# Patient Record
Sex: FEMALE | Race: WHITE | NOT HISPANIC OR LATINO | Employment: OTHER | URBAN - METROPOLITAN AREA
[De-identification: names, ages, dates, MRNs, and addresses within clinical notes are randomized per-mention and may not be internally consistent; named-entity substitution may affect disease eponyms.]

---

## 2023-04-25 LAB
ALANINE AMINOTRANSFERASE (SGPT) (U/L) IN SER/PLAS: 17 U/L (ref 7–45)
ALBUMIN (G/DL) IN SER/PLAS: 4.2 G/DL (ref 3.4–5)
ALKALINE PHOSPHATASE (U/L) IN SER/PLAS: 94 U/L (ref 33–136)
ANION GAP IN SER/PLAS: 13 MMOL/L (ref 10–20)
ASPARTATE AMINOTRANSFERASE (SGOT) (U/L) IN SER/PLAS: 16 U/L (ref 9–39)
BILIRUBIN TOTAL (MG/DL) IN SER/PLAS: 0.6 MG/DL (ref 0–1.2)
CALCIUM (MG/DL) IN SER/PLAS: 9.6 MG/DL (ref 8.6–10.6)
CARBON DIOXIDE, TOTAL (MMOL/L) IN SER/PLAS: 28 MMOL/L (ref 21–32)
CHLORIDE (MMOL/L) IN SER/PLAS: 104 MMOL/L (ref 98–107)
CREATININE (MG/DL) IN SER/PLAS: 0.85 MG/DL (ref 0.5–1.05)
ESTIMATED AVERAGE GLUCOSE FOR HBA1C: 151 MG/DL
GFR FEMALE: 73 ML/MIN/1.73M2
GLUCOSE (MG/DL) IN SER/PLAS: 144 MG/DL (ref 74–99)
HEMOGLOBIN A1C/HEMOGLOBIN TOTAL IN BLOOD: 6.9 %
POTASSIUM (MMOL/L) IN SER/PLAS: 4.5 MMOL/L (ref 3.5–5.3)
PROTEIN TOTAL: 6.5 G/DL (ref 6.4–8.2)
SODIUM (MMOL/L) IN SER/PLAS: 140 MMOL/L (ref 136–145)
THYROTROPIN (MIU/L) IN SER/PLAS BY DETECTION LIMIT <= 0.05 MIU/L: 1.84 MIU/L (ref 0.44–3.98)
UREA NITROGEN (MG/DL) IN SER/PLAS: 21 MG/DL (ref 6–23)

## 2023-09-14 PROBLEM — R94.31 ABNORMAL ECG: Status: ACTIVE | Noted: 2023-09-14

## 2023-09-14 PROBLEM — E10.9: Status: ACTIVE | Noted: 2023-09-14

## 2023-09-14 PROBLEM — R42 ORTHOSTATIC LIGHTHEADEDNESS: Status: ACTIVE | Noted: 2023-09-14

## 2023-09-14 PROBLEM — K64.9 HEMORRHOID: Status: ACTIVE | Noted: 2023-09-14

## 2023-09-14 PROBLEM — R92.30 DENSE BREAST TISSUE ON MAMMOGRAM: Status: ACTIVE | Noted: 2023-09-14

## 2023-09-14 PROBLEM — E10.65 POORLY CONTROLLED TYPE 1 DIABETES MELLITUS (MULTI): Status: ACTIVE | Noted: 2023-09-14

## 2023-09-14 PROBLEM — F32.A DEPRESSION: Status: ACTIVE | Noted: 2023-09-14

## 2023-09-14 PROBLEM — R42 VERTIGO: Status: ACTIVE | Noted: 2023-09-14

## 2023-09-14 PROBLEM — Z91.89 AT HIGH RISK FOR BREAST CANCER: Status: ACTIVE | Noted: 2023-09-14

## 2023-09-14 PROBLEM — E03.9 ADULT HYPOTHYROIDISM: Status: ACTIVE | Noted: 2023-09-14

## 2023-09-14 PROBLEM — Z71.84 COUNSELING FOR TRAVEL: Status: ACTIVE | Noted: 2023-09-14

## 2023-09-14 PROBLEM — R30.0 DYSURIA: Status: ACTIVE | Noted: 2023-09-14

## 2023-09-14 PROBLEM — R53.81 MALAISE: Status: ACTIVE | Noted: 2023-09-14

## 2023-09-14 PROBLEM — N90.89 VULVAR IRRITATION: Status: ACTIVE | Noted: 2023-09-14

## 2023-09-14 PROBLEM — K21.9 GERD WITHOUT ESOPHAGITIS: Status: ACTIVE | Noted: 2023-09-14

## 2023-09-14 PROBLEM — S61.012A THUMB LACERATION, LEFT, INITIAL ENCOUNTER: Status: ACTIVE | Noted: 2023-09-14

## 2023-09-14 PROBLEM — E55.9 VITAMIN D DEFICIENCY: Status: ACTIVE | Noted: 2023-09-14

## 2023-09-14 RX ORDER — PEN NEEDLE, DIABETIC 30 GX3/16"
NEEDLE, DISPOSABLE MISCELLANEOUS 4 TIMES DAILY
COMMUNITY

## 2023-09-14 RX ORDER — PROPRANOLOL/HYDROCHLOROTHIAZID 40 MG-25MG
1 TABLET ORAL DAILY
COMMUNITY
Start: 2016-05-09

## 2023-09-14 RX ORDER — ESCITALOPRAM OXALATE 10 MG/1
1 TABLET ORAL DAILY
COMMUNITY
End: 2024-04-16 | Stop reason: ALTCHOICE

## 2023-09-14 RX ORDER — MULTIVITAMIN/IRON/FOLIC ACID 18MG-0.4MG
1 TABLET ORAL DAILY
COMMUNITY

## 2023-09-14 RX ORDER — INSULIN LISPRO 100 [IU]/ML
INJECTION, SOLUTION INTRAVENOUS; SUBCUTANEOUS DAILY
COMMUNITY
Start: 2020-06-17

## 2023-09-14 RX ORDER — CALCIUM CITRATE/VITAMIN D3 315MG-6.25
2 TABLET ORAL EVERY MORNING
COMMUNITY

## 2023-09-14 RX ORDER — CHOLECALCIFEROL (VITAMIN D3) 50 MCG
1 TABLET ORAL DAILY
COMMUNITY

## 2023-09-14 RX ORDER — BACLOFEN 20 MG
1 TABLET ORAL DAILY
COMMUNITY

## 2023-09-14 RX ORDER — INSULIN DEGLUDEC 100 U/ML
INJECTION, SOLUTION SUBCUTANEOUS
COMMUNITY
Start: 2022-07-26

## 2023-09-14 RX ORDER — BLOOD-GLUCOSE METER
KIT MISCELLANEOUS
COMMUNITY
Start: 2015-11-24 | End: 2024-04-16 | Stop reason: SDUPTHER

## 2023-09-14 RX ORDER — POLYETHYLENE GLYCOL 3350, SODIUM CHLORIDE, SODIUM BICARBONATE, POTASSIUM CHLORIDE 420; 11.2; 5.72; 1.48 G/4L; G/4L; G/4L; G/4L
POWDER, FOR SOLUTION ORAL
COMMUNITY
Start: 2021-11-09 | End: 2023-10-17 | Stop reason: ALTCHOICE

## 2023-09-14 RX ORDER — GLUC/MSM/COLGN2/HYAL/ANTIARTH3 375-375-20
2 TABLET ORAL DAILY
COMMUNITY

## 2023-09-14 RX ORDER — BLOOD-GLUCOSE,RECEIVER,CONT
EACH MISCELLANEOUS
COMMUNITY

## 2023-09-14 RX ORDER — BIOTIN 5 MG
1 TABLET ORAL DAILY
COMMUNITY
Start: 2014-07-15

## 2023-09-14 RX ORDER — CALCIUM CARB/VITAMIN D3/VIT K1 500-100-40
TABLET,CHEWABLE ORAL 3 TIMES DAILY
COMMUNITY

## 2023-09-14 RX ORDER — ASPIRIN 81 MG/1
1 TABLET ORAL DAILY
COMMUNITY
Start: 2014-10-28 | End: 2024-01-16 | Stop reason: ALTCHOICE

## 2023-10-17 ENCOUNTER — OFFICE VISIT (OUTPATIENT)
Dept: ENDOCRINOLOGY | Facility: CLINIC | Age: 73
End: 2023-10-17
Payer: MEDICARE

## 2023-10-17 VITALS
SYSTOLIC BLOOD PRESSURE: 120 MMHG | BODY MASS INDEX: 21.97 KG/M2 | HEART RATE: 76 BPM | RESPIRATION RATE: 16 BRPM | DIASTOLIC BLOOD PRESSURE: 60 MMHG | WEIGHT: 140 LBS | HEIGHT: 67 IN

## 2023-10-17 DIAGNOSIS — E10.9 TYPE 1 DIABETES MELLITUS WITHOUT COMPLICATION (MULTI): Primary | ICD-10-CM

## 2023-10-17 PROCEDURE — 1036F TOBACCO NON-USER: CPT | Performed by: INTERNAL MEDICINE

## 2023-10-17 PROCEDURE — 99213 OFFICE O/P EST LOW 20 MIN: CPT | Performed by: INTERNAL MEDICINE

## 2023-10-17 PROCEDURE — 3074F SYST BP LT 130 MM HG: CPT | Performed by: INTERNAL MEDICINE

## 2023-10-17 PROCEDURE — 3044F HG A1C LEVEL LT 7.0%: CPT | Performed by: INTERNAL MEDICINE

## 2023-10-17 PROCEDURE — 1126F AMNT PAIN NOTED NONE PRSNT: CPT | Performed by: INTERNAL MEDICINE

## 2023-10-17 PROCEDURE — 3078F DIAST BP <80 MM HG: CPT | Performed by: INTERNAL MEDICINE

## 2023-10-17 RX ORDER — LANCETS 28 GAUGE
1 EACH MISCELLANEOUS AS NEEDED
COMMUNITY

## 2023-10-17 ASSESSMENT — ENCOUNTER SYMPTOMS
FEVER: 0
NAUSEA: 0
VOMITING: 0
PALPITATIONS: 0
DIARRHEA: 0
FATIGUE: 0
CHILLS: 0
SHORTNESS OF BREATH: 0
HEADACHES: 0
COUGH: 0

## 2023-10-17 NOTE — PROGRESS NOTES
"Subjective   Patient ID: Nata Ridley is a 73 y.o. female who presents for Diabetes (Type 1).  HPI  Since last visit doing well with sugars lately, 73% tir.   Still with some am higher numbers but overall doing well.   No issues with tandem + dexcom   Travelling to [place] this week and has basal sample and loaner pump    Review of Systems   Constitutional:  Negative for chills, fatigue and fever.   Respiratory:  Negative for cough and shortness of breath.    Cardiovascular:  Negative for chest pain and palpitations.   Gastrointestinal:  Negative for diarrhea, nausea and vomiting.   Neurological:  Negative for headaches.       Objective    10/17/2023 11:58 AM   /60   Pulse 76   Resp 16   Weight 63.5 kg (140 lb)   Height 1.702 m (5' 7\")       Physical Exam  Constitutional:       Appearance: Normal appearance. She is normal weight.   HENT:      Head: Normocephalic and atraumatic.   Neck:      Thyroid: No thyroid mass, thyromegaly or thyroid tenderness.   Cardiovascular:      Rate and Rhythm: Normal rate and regular rhythm.      Heart sounds: No murmur heard.     No gallop.   Pulmonary:      Effort: Pulmonary effort is normal.      Breath sounds: Normal breath sounds.   Abdominal:      Palpations: Abdomen is soft.      Comments: benign   Neurological:      General: No focal deficit present.      Mental Status: She is alert and oriented to person, place, and time.      Deep Tendon Reflexes: Reflexes are normal and symmetric.   Psychiatric:         Behavior: Behavior is cooperative.         Assessment/Plan   Problem List Items Addressed This Visit    None  Visit Diagnoses         Codes    Type 1 diabetes mellitus without complication (CMS/[facility])    -  Primary E10.9    Relevant Orders    Comprehensive Metabolic Panel    CBC    Lipid Panel    Hemoglobin A1C    Albumin , Urine Random          Due for fasting labs  Sugars on review of tconnect look good  Discussed dexcom 7 and upcoming clearance  Follow up in 3 months   "

## 2023-10-18 ENCOUNTER — LAB (OUTPATIENT)
Dept: LAB | Facility: LAB | Age: 73
End: 2023-10-18
Payer: MEDICARE

## 2023-10-18 DIAGNOSIS — E10.9 TYPE 1 DIABETES MELLITUS WITHOUT COMPLICATION (MULTI): ICD-10-CM

## 2023-10-18 LAB
ALBUMIN SERPL BCP-MCNC: 4.1 G/DL (ref 3.4–5)
ALP SERPL-CCNC: 77 U/L (ref 33–136)
ALT SERPL W P-5'-P-CCNC: 11 U/L (ref 7–45)
ANION GAP SERPL CALC-SCNC: 11 MMOL/L (ref 10–20)
AST SERPL W P-5'-P-CCNC: 15 U/L (ref 9–39)
BILIRUB SERPL-MCNC: 0.5 MG/DL (ref 0–1.2)
BUN SERPL-MCNC: 17 MG/DL (ref 6–23)
CALCIUM SERPL-MCNC: 9.4 MG/DL (ref 8.6–10.6)
CHLORIDE SERPL-SCNC: 105 MMOL/L (ref 98–107)
CHOLEST SERPL-MCNC: 193 MG/DL (ref 0–199)
CHOLESTEROL/HDL RATIO: 2.9
CO2 SERPL-SCNC: 30 MMOL/L (ref 21–32)
CREAT SERPL-MCNC: 0.73 MG/DL (ref 0.5–1.05)
CREAT UR-MCNC: 79.6 MG/DL (ref 20–320)
ERYTHROCYTE [DISTWIDTH] IN BLOOD BY AUTOMATED COUNT: 12.8 % (ref 11.5–14.5)
EST. AVERAGE GLUCOSE BLD GHB EST-MCNC: 154 MG/DL
GFR SERPL CREATININE-BSD FRML MDRD: 88 ML/MIN/1.73M*2
GLUCOSE SERPL-MCNC: 123 MG/DL (ref 74–99)
HBA1C MFR BLD: 7 %
HCT VFR BLD AUTO: 41.5 % (ref 36–46)
HDLC SERPL-MCNC: 65.5 MG/DL
HGB BLD-MCNC: 13.3 G/DL (ref 12–16)
LDLC SERPL CALC-MCNC: 112 MG/DL
MCH RBC QN AUTO: 30.4 PG (ref 26–34)
MCHC RBC AUTO-ENTMCNC: 32 G/DL (ref 32–36)
MCV RBC AUTO: 95 FL (ref 80–100)
MICROALBUMIN UR-MCNC: <7 MG/L
MICROALBUMIN/CREAT UR: NORMAL MG/G{CREAT}
NON HDL CHOLESTEROL: 128 MG/DL (ref 0–149)
NRBC BLD-RTO: 0 /100 WBCS (ref 0–0)
PLATELET # BLD AUTO: 278 X10*3/UL (ref 150–450)
PMV BLD AUTO: 10.6 FL (ref 7.5–11.5)
POTASSIUM SERPL-SCNC: 4.4 MMOL/L (ref 3.5–5.3)
PROT SERPL-MCNC: 6.4 G/DL (ref 6.4–8.2)
RBC # BLD AUTO: 4.38 X10*6/UL (ref 4–5.2)
SODIUM SERPL-SCNC: 142 MMOL/L (ref 136–145)
TRIGL SERPL-MCNC: 79 MG/DL (ref 0–149)
VLDL: 16 MG/DL (ref 0–40)
WBC # BLD AUTO: 4.9 X10*3/UL (ref 4.4–11.3)

## 2023-10-18 PROCEDURE — 80061 LIPID PANEL: CPT

## 2023-10-18 PROCEDURE — 82570 ASSAY OF URINE CREATININE: CPT

## 2023-10-18 PROCEDURE — 36415 COLL VENOUS BLD VENIPUNCTURE: CPT

## 2023-10-18 PROCEDURE — 83036 HEMOGLOBIN GLYCOSYLATED A1C: CPT

## 2023-10-18 PROCEDURE — 85027 COMPLETE CBC AUTOMATED: CPT

## 2023-10-18 PROCEDURE — 80053 COMPREHEN METABOLIC PANEL: CPT

## 2023-10-18 PROCEDURE — 82043 UR ALBUMIN QUANTITATIVE: CPT

## 2024-01-16 ENCOUNTER — OFFICE VISIT (OUTPATIENT)
Dept: ENDOCRINOLOGY | Facility: CLINIC | Age: 74
End: 2024-01-16
Payer: MEDICARE

## 2024-01-16 VITALS
WEIGHT: 137.6 LBS | RESPIRATION RATE: 16 BRPM | SYSTOLIC BLOOD PRESSURE: 112 MMHG | HEIGHT: 67 IN | HEART RATE: 80 BPM | DIASTOLIC BLOOD PRESSURE: 70 MMHG | BODY MASS INDEX: 21.6 KG/M2

## 2024-01-16 DIAGNOSIS — E10.9 CONTROLLED DIABETES MELLITUS TYPE 1 WITHOUT COMPLICATIONS (MULTI): Primary | ICD-10-CM

## 2024-01-16 DIAGNOSIS — E03.9 ADULT HYPOTHYROIDISM: ICD-10-CM

## 2024-01-16 LAB — POC HEMOGLOBIN A1C: 7.2 % (ref 4.2–6.5)

## 2024-01-16 PROCEDURE — 83036 HEMOGLOBIN GLYCOSYLATED A1C: CPT | Performed by: INTERNAL MEDICINE

## 2024-01-16 PROCEDURE — 1036F TOBACCO NON-USER: CPT | Performed by: INTERNAL MEDICINE

## 2024-01-16 PROCEDURE — 1159F MED LIST DOCD IN RCRD: CPT | Performed by: INTERNAL MEDICINE

## 2024-01-16 PROCEDURE — 1126F AMNT PAIN NOTED NONE PRSNT: CPT | Performed by: INTERNAL MEDICINE

## 2024-01-16 PROCEDURE — 3078F DIAST BP <80 MM HG: CPT | Performed by: INTERNAL MEDICINE

## 2024-01-16 PROCEDURE — 3074F SYST BP LT 130 MM HG: CPT | Performed by: INTERNAL MEDICINE

## 2024-01-16 PROCEDURE — 99213 OFFICE O/P EST LOW 20 MIN: CPT | Performed by: INTERNAL MEDICINE

## 2024-01-16 ASSESSMENT — ENCOUNTER SYMPTOMS
VOMITING: 0
FEVER: 0
HEADACHES: 0
CHILLS: 0
NAUSEA: 0
FATIGUE: 0
COUGH: 0
PALPITATIONS: 0
DIARRHEA: 0
SHORTNESS OF BREATH: 0

## 2024-01-16 NOTE — PROGRESS NOTES
Endocrinology: Follow up visit  Subjective   Patient ID: Nata Ridley is a 73 y.o. female who presents for Diabetes (Type 1).    PCP: Patt Nugent MD    HPI  Since last visit continues to utilize tandem pump + dexcom cgm for blood sugars.  Numbers are decent.  Tir at 70% most days.  She would prefer more days 70%+.  Still struggling with am sugars.  Feeling fine.        Review of Systems   Constitutional:  Negative for chills, fatigue and fever.   Respiratory:  Negative for cough and shortness of breath.    Cardiovascular:  Negative for chest pain and palpitations.   Gastrointestinal:  Negative for diarrhea, nausea and vomiting.   Neurological:  Negative for headaches.       Patient Active Problem List   Diagnosis    Abnormal ECG    Adult hypothyroidism    At high risk for breast cancer    Counseling for travel    Dense breast tissue on mammogram    Depression    Diabetes mellitus type I, controlled (CMS/[facility])    Dysuria    GERD without esophagitis    Hemorrhoid    Malaise    Orthostatic lightheadedness    Poorly controlled type 1 diabetes mellitus (CMS/HCC)    Thumb laceration, left, initial encounter    Vitamin D deficiency    Vertigo    Vulvar irritation        Home Meds:  Current Outpatient Medications   Medication Instructions    b complex 0.4 mg tablet 1 tablet, oral, Daily    biotin 5 mg tablet 1 tablet, oral, Daily    blood-glucose sensor (DEXCOM G6 SENSOR MISC) Use as directed changed every 10 days     blood-glucose transmitter (DEXCOM G6 TRANSMITTER MISC) Use one transmitter with /sensors and replace every 3 months     calcium citrate-vitamin D3 315 mg-6.25 mcg (250 unit) tablet 2 tablets, oral, Every morning    cholecalciferol (Vitamin D-3) 50 MCG (2000 UT) tablet 1 tablet, oral, Daily    Dexcom G4 platinum  (Dexcom G6 ) misc To monitor sugars e10.9    escitalopram (Lexapro) 10 mg tablet 1 tablet, oral, Daily    FreeStyle Lancets 28 gauge 1 each, miscellaneous, As needed, Use  "as instructed    FreeStyle Lite Strips strip CHECK BLOOD SUGAR 7 TIMES A DAY    glucosamine stein 2KCl-chondroit 500-400 mg tablet 2 tablets, oral, Daily    insulin degludec (Tresiba FlexTouch U-100) 100 unit/mL (3 mL) injection subcutaneous, USE AS DIRECTED.    insulin lispro (HumaLOG U-100 Insulin) 100 unit/mL injection subcutaneous, Daily, USE AS DIRECTED WITH INSULIN PUMP, UP TO 50 UNITS DAILY    insulin syringe-needle U-100 (BD Insulin Syringe Ultra-Fine) 31G X 5/16\" 0.3 mL syringe subcutaneous, 3 times daily, As directed with insulin     magnesium oxide 500 mg tablet 1 tablet, oral, Daily    pen needle, diabetic (BD Ultra-Fine Yuliet Pen Needle) 32 gauge x 5/32\" needle 4 times daily, Use with insulin     turmeric-turmeric root extract 450-50 mg capsule 1 capsule, oral, Daily        No Known Allergies     Objective   Vitals:    01/16/24 1153   BP: 112/70   Pulse: 80   Resp: 16      Vitals:    01/16/24 1153   Weight: 62.4 kg (137 lb 9.6 oz)      Body mass index is 21.55 kg/m².   Physical Exam  Constitutional:       Appearance: Normal appearance. She is normal weight.   HENT:      Head: Normocephalic and atraumatic.   Neck:      Thyroid: No thyroid mass, thyromegaly or thyroid tenderness.   Cardiovascular:      Rate and Rhythm: Normal rate and regular rhythm.      Heart sounds: No murmur heard.     No gallop.   Pulmonary:      Effort: Pulmonary effort is normal.      Breath sounds: Normal breath sounds.   Abdominal:      Palpations: Abdomen is soft.      Comments: benign   Neurological:      General: No focal deficit present.      Mental Status: She is alert and oriented to person, place, and time.      Deep Tendon Reflexes: Reflexes are normal and symmetric.   Psychiatric:         Behavior: Behavior is cooperative.         Labs:  Lab Results   Component Value Date    HGBA1C 7.0 (H) 10/18/2023    TSH 1.84 04/25/2023      No results found for: \"PR1\", \"THYROIDPAB\", \"TSI\"     Assessment/Plan   Problem List Items " Addressed This Visit       Adult hypothyroidism    Diabetes mellitus type I, controlled (CMS/[facility]) - Primary    Relevant Orders    POCT glycosylated hemoglobin (Hb A1C) manually resulted   Labs last time fasting looked good.  A1c today  Discussed adjust of am carb ratio today  Discussed op5 as an alternative for tighter control  Follow up in 3 months  Electronically signed by:  Ana Enrique MD 01/16/24 12:25 PM

## 2024-04-16 ENCOUNTER — LAB (OUTPATIENT)
Dept: LAB | Facility: LAB | Age: 74
End: 2024-04-16
Payer: MEDICARE

## 2024-04-16 ENCOUNTER — OFFICE VISIT (OUTPATIENT)
Dept: ENDOCRINOLOGY | Facility: CLINIC | Age: 74
End: 2024-04-16
Payer: MEDICARE

## 2024-04-16 VITALS
WEIGHT: 139 LBS | DIASTOLIC BLOOD PRESSURE: 64 MMHG | SYSTOLIC BLOOD PRESSURE: 112 MMHG | RESPIRATION RATE: 16 BRPM | BODY MASS INDEX: 21.82 KG/M2 | HEART RATE: 76 BPM | HEIGHT: 67 IN

## 2024-04-16 DIAGNOSIS — E03.9 ADULT HYPOTHYROIDISM: ICD-10-CM

## 2024-04-16 DIAGNOSIS — E10.9 TYPE 1 DIABETES MELLITUS WITHOUT COMPLICATION (MULTI): ICD-10-CM

## 2024-04-16 DIAGNOSIS — E10.9 TYPE 1 DIABETES MELLITUS WITHOUT COMPLICATION (MULTI): Primary | ICD-10-CM

## 2024-04-16 LAB
EST. AVERAGE GLUCOSE BLD GHB EST-MCNC: 140 MG/DL
HBA1C MFR BLD: 6.5 %

## 2024-04-16 PROCEDURE — 83036 HEMOGLOBIN GLYCOSYLATED A1C: CPT

## 2024-04-16 PROCEDURE — 1160F RVW MEDS BY RX/DR IN RCRD: CPT | Performed by: INTERNAL MEDICINE

## 2024-04-16 PROCEDURE — 3078F DIAST BP <80 MM HG: CPT | Performed by: INTERNAL MEDICINE

## 2024-04-16 PROCEDURE — 36415 COLL VENOUS BLD VENIPUNCTURE: CPT

## 2024-04-16 PROCEDURE — 99213 OFFICE O/P EST LOW 20 MIN: CPT | Performed by: INTERNAL MEDICINE

## 2024-04-16 PROCEDURE — 84443 ASSAY THYROID STIM HORMONE: CPT

## 2024-04-16 PROCEDURE — 1159F MED LIST DOCD IN RCRD: CPT | Performed by: INTERNAL MEDICINE

## 2024-04-16 PROCEDURE — 1036F TOBACCO NON-USER: CPT | Performed by: INTERNAL MEDICINE

## 2024-04-16 PROCEDURE — 80053 COMPREHEN METABOLIC PANEL: CPT

## 2024-04-16 PROCEDURE — 3074F SYST BP LT 130 MM HG: CPT | Performed by: INTERNAL MEDICINE

## 2024-04-16 RX ORDER — BLOOD-GLUCOSE METER
KIT MISCELLANEOUS
Qty: 300 STRIP | Refills: 3 | Status: SHIPPED
Start: 2024-04-16 | End: 2024-04-18 | Stop reason: SDUPTHER

## 2024-04-16 ASSESSMENT — ENCOUNTER SYMPTOMS
FATIGUE: 0
PALPITATIONS: 0
DIARRHEA: 0
VOMITING: 0
COUGH: 0
CHILLS: 0
FEVER: 0
SHORTNESS OF BREATH: 0
NAUSEA: 0
HEADACHES: 0

## 2024-04-16 NOTE — PATIENT INSTRUCTIONS
Labs today  Think about omnipod  Take less than carb ratio recommended dose if plan to exercise just after exercise   Follow up in 3 months

## 2024-04-16 NOTE — PROGRESS NOTES
Endocrinology: Follow up visit  Subjective   Patient ID: Nata Ridley is a 73 y.o. female who presents for Diabetes (Type 1 ).    PCP: Patt Nugent MD    HPI  Since last visit doing well on tandem and dexcom.   Issues lately however with dropping when exercises right after eating.   Tir 75%  Last time discussed op5.... she is undecided.   Feeling fine.      Checks sugars 4x+ a day  Injects insulin via insulin pump  Currently utilizing cgm to check sugars      Review of Systems   Constitutional:  Negative for chills, fatigue and fever.   Respiratory:  Negative for cough and shortness of breath.    Cardiovascular:  Negative for chest pain and palpitations.   Gastrointestinal:  Negative for diarrhea, nausea and vomiting.   Neurological:  Negative for headaches.       Patient Active Problem List   Diagnosis    Abnormal ECG    Adult hypothyroidism    At high risk for breast cancer    Counseling for travel    Dense breast tissue on mammogram    Depression    Diabetes mellitus type I, controlled (Multi)    Dysuria    GERD without esophagitis    Hemorrhoid    Malaise    Orthostatic lightheadedness    Poorly controlled type 1 diabetes mellitus (Multi)    Thumb laceration, left, initial encounter    Vitamin D deficiency    Vertigo    Vulvar irritation        Home Meds:  Current Outpatient Medications   Medication Instructions    b complex 0.4 mg tablet 1 tablet, oral, Daily    biotin 5 mg tablet 1 tablet, oral, Daily    blood-glucose sensor (DEXCOM G6 SENSOR MISC) Use as directed changed every 10 days     blood-glucose transmitter (DEXCOM G6 TRANSMITTER MISC) Use one transmitter with /sensors and replace every 3 months     calcium citrate-vitamin D3 315 mg-6.25 mcg (250 unit) tablet 2 tablets, oral, Every morning    cholecalciferol (Vitamin D-3) 50 MCG (2000 UT) tablet 1 tablet, oral, Daily    Dexcom G4 platinum  (Dexcom G6 ) misc To monitor sugars e10.9    FreeStyle Lancets 28 gauge 1 each,  "miscellaneous, As needed, Use as instructed    FreeStyle Lite Strips strip CHECK BLOOD SUGAR 7 TIMES A DAY    glucosamine stein 2KCl-chondroit 500-400 mg tablet 2 tablets, oral, Daily    insulin degludec (Tresiba FlexTouch U-100) 100 unit/mL (3 mL) injection subcutaneous, USE AS DIRECTED.    insulin lispro (HumaLOG U-100 Insulin) 100 unit/mL injection subcutaneous, Daily, USE AS DIRECTED WITH INSULIN PUMP, UP TO 50 UNITS DAILY    insulin syringe-needle U-100 (BD Insulin Syringe Ultra-Fine) 31G X 5/16\" 0.3 mL syringe subcutaneous, 3 times daily, As directed with insulin     magnesium oxide 500 mg tablet 1 tablet, oral, Daily    pen needle, diabetic (BD Ultra-Fine Yuliet Pen Needle) 32 gauge x 5/32\" needle 4 times daily, Use with insulin     turmeric-turmeric root extract 450-50 mg capsule 1 capsule, oral, Daily        No Known Allergies     Objective   Vitals:    04/16/24 1248   BP: 112/64   Pulse: 76   Resp: 16      Vitals:    04/16/24 1248   Weight: 63 kg (139 lb)      Body mass index is 21.77 kg/m².   Physical Exam  Constitutional:       Appearance: Normal appearance. She is normal weight.   HENT:      Head: Normocephalic and atraumatic.   Neck:      Thyroid: No thyroid mass, thyromegaly or thyroid tenderness.   Cardiovascular:      Rate and Rhythm: Normal rate and regular rhythm.      Heart sounds: No murmur heard.     No gallop.   Pulmonary:      Effort: Pulmonary effort is normal.      Breath sounds: Normal breath sounds.   Abdominal:      Palpations: Abdomen is soft.      Comments: benign   Neurological:      General: No focal deficit present.      Mental Status: She is alert and oriented to person, place, and time.      Deep Tendon Reflexes: Reflexes are normal and symmetric.   Psychiatric:         Behavior: Behavior is cooperative.         Labs:  Lab Results   Component Value Date    HGBA1C 7.2 (A) 01/16/2024    TSH 1.84 04/25/2023      No results found for: \"PR1\", \"THYROIDPAB\", \"TSI\"     Assessment/Plan "   Problem List Items Addressed This Visit       Adult hypothyroidism    Relevant Orders    TSH with reflex to Free T4 if abnormal     Other Visit Diagnoses       Type 1 diabetes mellitus without complication (Multi)    -  Primary    Relevant Orders    Comprehensive Metabolic Panel    Hemoglobin A1C          Sugars reviewed  Tir excellent  Discussed op5 system  Discussed taking less than carb ratio dictates when plans to exercise after eating  Labs today  Follow up in 3 months    Electronically signed by:  Ana Enrique MD 04/16/24 1:21 PM

## 2024-04-17 LAB
ALBUMIN SERPL BCP-MCNC: 4.1 G/DL (ref 3.4–5)
ALP SERPL-CCNC: 74 U/L (ref 33–136)
ALT SERPL W P-5'-P-CCNC: 15 U/L (ref 7–45)
ANION GAP SERPL CALC-SCNC: 13 MMOL/L (ref 10–20)
AST SERPL W P-5'-P-CCNC: 16 U/L (ref 9–39)
BILIRUB SERPL-MCNC: 0.6 MG/DL (ref 0–1.2)
BUN SERPL-MCNC: 23 MG/DL (ref 6–23)
CALCIUM SERPL-MCNC: 9.6 MG/DL (ref 8.6–10.6)
CHLORIDE SERPL-SCNC: 103 MMOL/L (ref 98–107)
CO2 SERPL-SCNC: 30 MMOL/L (ref 21–32)
CREAT SERPL-MCNC: 0.85 MG/DL (ref 0.5–1.05)
EGFRCR SERPLBLD CKD-EPI 2021: 73 ML/MIN/1.73M*2
GLUCOSE SERPL-MCNC: 62 MG/DL (ref 74–99)
POTASSIUM SERPL-SCNC: 4 MMOL/L (ref 3.5–5.3)
PROT SERPL-MCNC: 6.5 G/DL (ref 6.4–8.2)
SODIUM SERPL-SCNC: 142 MMOL/L (ref 136–145)
TSH SERPL-ACNC: 1.88 MIU/L (ref 0.44–3.98)

## 2024-04-18 DIAGNOSIS — E10.9 TYPE 1 DIABETES MELLITUS WITHOUT COMPLICATION (MULTI): ICD-10-CM

## 2024-04-18 RX ORDER — BLOOD-GLUCOSE METER
KIT MISCELLANEOUS
Qty: 100 STRIP | Refills: 3 | Status: SHIPPED | OUTPATIENT
Start: 2024-04-18

## 2024-06-13 ENCOUNTER — TRANSCRIBE ORDERS (OUTPATIENT)
Dept: OBSTETRICS AND GYNECOLOGY | Facility: CLINIC | Age: 74
End: 2024-06-13
Payer: MEDICARE

## 2024-06-13 DIAGNOSIS — Z12.31 BREAST CANCER SCREENING BY MAMMOGRAM: Primary | ICD-10-CM

## 2024-06-18 DIAGNOSIS — E10.9 TYPE 1 DIABETES MELLITUS WITHOUT COMPLICATION (MULTI): Primary | ICD-10-CM

## 2024-06-18 RX ORDER — INSULIN LISPRO 100 U/ML
INJECTION, SOLUTION INTRAVENOUS; SUBCUTANEOUS
Qty: 50 ML | Refills: 3 | Status: SHIPPED | OUTPATIENT
Start: 2024-06-18

## 2024-06-28 DIAGNOSIS — E10.9 TYPE 1 DIABETES MELLITUS WITHOUT COMPLICATION (MULTI): Primary | ICD-10-CM

## 2024-06-28 RX ORDER — INSULIN PUMP CARTRIDGE
CARTRIDGE (EA) SUBCUTANEOUS
Qty: 30 EACH | Refills: 5 | Status: SHIPPED | OUTPATIENT
Start: 2024-06-28

## 2024-06-28 RX ORDER — INFUSION SET FOR INSULIN PUMP
INFUSION SETS-PARAPHERNALIA MISCELLANEOUS
Qty: 30 EACH | Refills: 5 | Status: SHIPPED | OUTPATIENT
Start: 2024-06-28

## 2024-07-23 ENCOUNTER — APPOINTMENT (OUTPATIENT)
Dept: ENDOCRINOLOGY | Facility: CLINIC | Age: 74
End: 2024-07-23
Payer: MEDICARE

## 2024-07-23 VITALS
HEIGHT: 67 IN | WEIGHT: 137 LBS | HEART RATE: 76 BPM | DIASTOLIC BLOOD PRESSURE: 70 MMHG | SYSTOLIC BLOOD PRESSURE: 112 MMHG | BODY MASS INDEX: 21.5 KG/M2 | RESPIRATION RATE: 16 BRPM

## 2024-07-23 DIAGNOSIS — E10.9 TYPE 1 DIABETES MELLITUS WITHOUT COMPLICATION (MULTI): Primary | ICD-10-CM

## 2024-07-23 DIAGNOSIS — E03.9 ADULT HYPOTHYROIDISM: ICD-10-CM

## 2024-07-23 LAB — POC HEMOGLOBIN A1C: 6.6 % (ref 4.2–6.5)

## 2024-07-23 PROCEDURE — 1159F MED LIST DOCD IN RCRD: CPT | Performed by: INTERNAL MEDICINE

## 2024-07-23 PROCEDURE — 1160F RVW MEDS BY RX/DR IN RCRD: CPT | Performed by: INTERNAL MEDICINE

## 2024-07-23 PROCEDURE — 3044F HG A1C LEVEL LT 7.0%: CPT | Performed by: INTERNAL MEDICINE

## 2024-07-23 PROCEDURE — 99213 OFFICE O/P EST LOW 20 MIN: CPT | Performed by: INTERNAL MEDICINE

## 2024-07-23 PROCEDURE — 1036F TOBACCO NON-USER: CPT | Performed by: INTERNAL MEDICINE

## 2024-07-23 PROCEDURE — 3074F SYST BP LT 130 MM HG: CPT | Performed by: INTERNAL MEDICINE

## 2024-07-23 PROCEDURE — 3008F BODY MASS INDEX DOCD: CPT | Performed by: INTERNAL MEDICINE

## 2024-07-23 PROCEDURE — 83036 HEMOGLOBIN GLYCOSYLATED A1C: CPT | Performed by: INTERNAL MEDICINE

## 2024-07-23 PROCEDURE — 3078F DIAST BP <80 MM HG: CPT | Performed by: INTERNAL MEDICINE

## 2024-07-23 ASSESSMENT — ENCOUNTER SYMPTOMS
VOMITING: 0
SHORTNESS OF BREATH: 0
FEVER: 0
CHILLS: 0
FATIGUE: 0
PALPITATIONS: 0
HEADACHES: 0
COUGH: 0
DIARRHEA: 0
NAUSEA: 0

## 2024-07-23 NOTE — PROGRESS NOTES
Endocrinology: Follow up visit  Subjective   Patient ID: Nata Ridley is a 74 y.o. female who presents for Diabetes (type1).    PCP: Patt Nugent MD    HPI  Dm1: on tandem+ dexcom 6.  Since last visit sugars excellent but did message regarding lows a month ago.    Tconnect data at that time showed stacking boluses with resulting lows.  Doing better since then.  Not able to log into tconnect today    Checks sugars 4x+ a day  Injects insulin via insulin pump  Currently utilizing cgm to check sugars     Review of Systems   Constitutional:  Negative for chills, fatigue and fever.   Respiratory:  Negative for cough and shortness of breath.    Cardiovascular:  Negative for chest pain and palpitations.   Gastrointestinal:  Negative for diarrhea, nausea and vomiting.   Neurological:  Negative for headaches.       Patient Active Problem List   Diagnosis    Abnormal ECG    Adult hypothyroidism    At high risk for breast cancer    Counseling for travel    Dense breast tissue on mammogram    Depression    Diabetes mellitus type I, controlled (Multi)    Dysuria    GERD without esophagitis    Hemorrhoid    Malaise    Orthostatic lightheadedness    Poorly controlled type 1 diabetes mellitus (Multi)    Thumb laceration, left, initial encounter    Vitamin D deficiency    Vertigo    Vulvar irritation        Home Meds:  Current Outpatient Medications   Medication Instructions    Admelog U-100 Insulin lispro 100 unit/mL injection Use as directed with insulin pump up to 50 units daily    Autosoft 90 infusion set CHANGE EVERY THREE (3) DAYS    b complex 0.4 mg tablet 1 tablet, oral, Daily    biotin 5 mg tablet 1 tablet, oral, Daily    blood sugar diagnostic (FreeStyle Lite Strips) strip CHECK BLOOD SUGAR 3 TIMES A DAY    blood-glucose sensor (DEXCOM G6 SENSOR MISC) Use as directed changed every 10 days     blood-glucose transmitter (DEXCOM G6 TRANSMITTER MISC) Use one transmitter with /sensors and replace every 3 months   "   cholecalciferol (Vitamin D-3) 50 MCG (2000 UT) tablet 1 tablet, oral, Daily    Dexcom G4 platinum  (Dexcom G6 ) misc To monitor sugars e10.9    FreeStyle Lancets 28 gauge 1 each, miscellaneous, As needed, Use as instructed    glucosamine stein 2KCl-chondroit 500-400 mg tablet 2 tablets, oral, Daily    insulin degludec (Tresiba FlexTouch U-100) 100 unit/mL (3 mL) injection subcutaneous, USE AS DIRECTED.    insulin syringe-needle U-100 (BD Insulin Syringe Ultra-Fine) 31G X 5/16\" 0.3 mL syringe subcutaneous, 3 times daily, As directed with insulin     magnesium oxide 500 mg tablet 1 tablet, oral, Daily    pen needle, diabetic (BD Ultra-Fine Yuliet Pen Needle) 32 gauge x 5/32\" needle 4 times daily, Use with insulin     t:slim X2 cartridge CHANGE EVERY THREE (3) DAYS    turmeric-turmeric root extract 450-50 mg capsule 1 capsule, oral, Daily        No Known Allergies     Objective   Vitals:    07/23/24 1230   BP: 112/70   Pulse: 76   Resp: 16      Vitals:    07/23/24 1230   Weight: 62.1 kg (137 lb)      Body mass index is 21.46 kg/m².   Physical Exam  Constitutional:       Appearance: Normal appearance. She is normal weight.   HENT:      Head: Normocephalic and atraumatic.   Neck:      Thyroid: No thyroid mass, thyromegaly or thyroid tenderness.   Cardiovascular:      Rate and Rhythm: Normal rate and regular rhythm.      Heart sounds: No murmur heard.     No gallop.   Pulmonary:      Effort: Pulmonary effort is normal.      Breath sounds: Normal breath sounds.   Abdominal:      Palpations: Abdomen is soft.      Comments: benign   Neurological:      General: No focal deficit present.      Mental Status: She is alert and oriented to person, place, and time.      Deep Tendon Reflexes: Reflexes are normal and symmetric.   Psychiatric:         Behavior: Behavior is cooperative.         Labs:  Lab Results   Component Value Date    HGBA1C 6.5 (H) 04/16/2024    TSH 1.88 04/16/2024      No results found for: \"PR1\", " "\"THYROIDPAB\", \"TSI\"     Assessment/Plan   Assessment & Plan  Type 1 diabetes mellitus without complication (Multi)    Orders:    CBC; Future    Comprehensive Metabolic Panel; Future    Lipid Panel; Future    Hemoglobin A1C; Future    Albumin-Creatinine Ratio, Urine Random; Future    POCT glycosylated hemoglobin (Hb A1C) manually resulted  a1c today  Discussed sugars, improved by report  Discussed expectations for meal bolusing  Next time due for fasting labs  Adult hypothyroidism  Last tsh well controlled           Electronically signed by:  Ana Enrique MD 07/23/24 12:36 PM              "

## 2024-09-04 ENCOUNTER — APPOINTMENT (OUTPATIENT)
Dept: OBSTETRICS AND GYNECOLOGY | Facility: CLINIC | Age: 74
End: 2024-09-04
Payer: MEDICARE

## 2024-09-04 VITALS
DIASTOLIC BLOOD PRESSURE: 64 MMHG | WEIGHT: 134 LBS | HEIGHT: 67 IN | BODY MASS INDEX: 21.03 KG/M2 | SYSTOLIC BLOOD PRESSURE: 101 MMHG

## 2024-09-04 DIAGNOSIS — Z12.4 CERVICAL CANCER SCREENING: ICD-10-CM

## 2024-09-04 DIAGNOSIS — Z01.419 WELL WOMAN EXAM: Primary | ICD-10-CM

## 2024-09-04 PROCEDURE — 3008F BODY MASS INDEX DOCD: CPT | Performed by: OBSTETRICS & GYNECOLOGY

## 2024-09-04 PROCEDURE — 87624 HPV HI-RISK TYP POOLED RSLT: CPT

## 2024-09-04 PROCEDURE — 1126F AMNT PAIN NOTED NONE PRSNT: CPT | Performed by: OBSTETRICS & GYNECOLOGY

## 2024-09-04 PROCEDURE — 3044F HG A1C LEVEL LT 7.0%: CPT | Performed by: OBSTETRICS & GYNECOLOGY

## 2024-09-04 PROCEDURE — G0101 CA SCREEN;PELVIC/BREAST EXAM: HCPCS | Performed by: OBSTETRICS & GYNECOLOGY

## 2024-09-04 PROCEDURE — 1159F MED LIST DOCD IN RCRD: CPT | Performed by: OBSTETRICS & GYNECOLOGY

## 2024-09-04 PROCEDURE — 1160F RVW MEDS BY RX/DR IN RCRD: CPT | Performed by: OBSTETRICS & GYNECOLOGY

## 2024-09-04 PROCEDURE — 1036F TOBACCO NON-USER: CPT | Performed by: OBSTETRICS & GYNECOLOGY

## 2024-09-04 PROCEDURE — 3074F SYST BP LT 130 MM HG: CPT | Performed by: OBSTETRICS & GYNECOLOGY

## 2024-09-04 PROCEDURE — 3078F DIAST BP <80 MM HG: CPT | Performed by: OBSTETRICS & GYNECOLOGY

## 2024-09-04 PROCEDURE — 88175 CYTOPATH C/V AUTO FLUID REDO: CPT

## 2024-09-04 ASSESSMENT — ENCOUNTER SYMPTOMS
DEPRESSION: 0
HEMATURIA: 0
SHORTNESS OF BREATH: 0
SLEEP DISTURBANCE: 0
FREQUENCY: 0
DYSURIA: 0
FEVER: 0
OCCASIONAL FEELINGS OF UNSTEADINESS: 0
FATIGUE: 0
VOMITING: 0
ABDOMINAL DISTENTION: 0
LOSS OF SENSATION IN FEET: 1
DIARRHEA: 0
CHILLS: 0
UNEXPECTED WEIGHT CHANGE: 0
CONSTIPATION: 0
ABDOMINAL PAIN: 0
COLOR CHANGE: 0
APPETITE CHANGE: 0
NAUSEA: 0
BACK PAIN: 0
FLANK PAIN: 0
BLOOD IN STOOL: 0

## 2024-09-04 ASSESSMENT — PAIN SCALES - GENERAL: PAINLEVEL: 0-NO PAIN

## 2024-09-04 NOTE — PROGRESS NOTES
"History Of Present Illness  Routine Gyn Exam  Nata Ridley here for routine WWE.  Pt is postmenopausal.  Denies spotting or bleeding.     Concerns: none.     New health issues or surgeries since last visit: denies .  Exercise: regular .     Gynecologic History  Postmenopausal.  Sexually active: not currently .  Last Pap: .   Last mammogram: .   Last Colonoscopy:  .   Last DEXA: .     Obstetric History  OB History    Para Term  AB Living   0 0 0 0 0 0   SAB IAB Ectopic Multiple Live Births   0 0 0 0 0        Review of Systems   Constitutional:  Negative for appetite change, chills, fatigue, fever and unexpected weight change.   Respiratory:  Negative for shortness of breath.    Cardiovascular:  Negative for chest pain.   Gastrointestinal:  Negative for abdominal distention, abdominal pain, blood in stool, constipation, diarrhea, nausea and vomiting.   Endocrine: Negative for cold intolerance and heat intolerance.   Genitourinary:  Negative for dyspareunia, dysuria, flank pain, frequency, genital sores, hematuria, menstrual problem, pelvic pain, urgency, vaginal bleeding, vaginal discharge and vaginal pain.   Musculoskeletal:  Negative for back pain.   Skin:  Negative for color change.   Psychiatric/Behavioral:  Negative for sleep disturbance.        /64 (BP Location: Left arm, Patient Position: Sitting)   Ht 1.702 m (5' 7\")   Wt 60.8 kg (134 lb)   LMP  (LMP Unknown)   BMI 20.99 kg/m²      Physical Exam  Constitutional:       Appearance: Normal appearance.   HENT:      Head: Normocephalic and atraumatic.   Chest:   Breasts:     Right: Normal.      Left: Normal.   Abdominal:      General: Abdomen is flat.      Palpations: Abdomen is soft.      Tenderness: There is no abdominal tenderness.   Genitourinary:     General: Normal vulva.      Vagina: Normal.      Cervix: Normal.      Uterus: Normal.       Adnexa: Right adnexa normal and left adnexa normal.      Comments: Pap " collected today    Skin:     General: Skin is warm and dry.   Neurological:      Mental Status: She is alert and oriented to person, place, and time.   Psychiatric:         Mood and Affect: Mood normal.              Assessment/Plan         Routine Well Woman Exam Today  Discussed diet and exercise.   Reviewed routine health screenings.   Pap: pap done today ; discussed that this could possibly be last pap d/t age > 71 yo and no history of dysplasia, pt will consider ; cont with breast and pelvic exams every other year   Recommend annual mammograms. Mammogram ordered and patient is scheduled for tomorrow.   Currently up to date on colon cancer screening.                  Shawanda Correia MD

## 2024-09-05 ENCOUNTER — HOSPITAL ENCOUNTER (OUTPATIENT)
Dept: RADIOLOGY | Facility: CLINIC | Age: 74
Discharge: HOME | End: 2024-09-05
Payer: MEDICARE

## 2024-09-05 DIAGNOSIS — Z12.31 BREAST CANCER SCREENING BY MAMMOGRAM: ICD-10-CM

## 2024-09-05 PROCEDURE — 77067 SCR MAMMO BI INCL CAD: CPT | Mod: 52,RT

## 2024-09-13 LAB
CYTOLOGY CMNT CVX/VAG CYTO-IMP: NORMAL
HPV HR 12 DNA GENITAL QL NAA+PROBE: NEGATIVE
HPV HR GENOTYPES PNL CVX NAA+PROBE: NEGATIVE
HPV16 DNA SPEC QL NAA+PROBE: NEGATIVE
HPV18 DNA SPEC QL NAA+PROBE: NEGATIVE
LAB AP HPV GENOTYPE QUESTION: YES
LAB AP HPV HR: NORMAL
LABORATORY COMMENT REPORT: NORMAL
PATH REPORT.TOTAL CANCER: NORMAL

## 2024-11-07 ENCOUNTER — APPOINTMENT (OUTPATIENT)
Dept: ENDOCRINOLOGY | Facility: CLINIC | Age: 74
End: 2024-11-07
Payer: MEDICARE

## 2024-11-07 VITALS
WEIGHT: 139.2 LBS | RESPIRATION RATE: 16 BRPM | HEART RATE: 69 BPM | HEIGHT: 67 IN | DIASTOLIC BLOOD PRESSURE: 62 MMHG | BODY MASS INDEX: 21.85 KG/M2 | SYSTOLIC BLOOD PRESSURE: 122 MMHG

## 2024-11-07 DIAGNOSIS — E10.9 TYPE 1 DIABETES MELLITUS WITHOUT COMPLICATION: Primary | ICD-10-CM

## 2024-11-07 DIAGNOSIS — E03.9 ADULT HYPOTHYROIDISM: ICD-10-CM

## 2024-11-07 LAB — POC HEMOGLOBIN A1C: 6.2 % (ref 4.2–6.5)

## 2024-11-07 PROCEDURE — 3074F SYST BP LT 130 MM HG: CPT | Performed by: INTERNAL MEDICINE

## 2024-11-07 PROCEDURE — 3044F HG A1C LEVEL LT 7.0%: CPT | Performed by: INTERNAL MEDICINE

## 2024-11-07 PROCEDURE — 3008F BODY MASS INDEX DOCD: CPT | Performed by: INTERNAL MEDICINE

## 2024-11-07 PROCEDURE — 1160F RVW MEDS BY RX/DR IN RCRD: CPT | Performed by: INTERNAL MEDICINE

## 2024-11-07 PROCEDURE — G2211 COMPLEX E/M VISIT ADD ON: HCPCS | Performed by: INTERNAL MEDICINE

## 2024-11-07 PROCEDURE — 3078F DIAST BP <80 MM HG: CPT | Performed by: INTERNAL MEDICINE

## 2024-11-07 PROCEDURE — 99213 OFFICE O/P EST LOW 20 MIN: CPT | Performed by: INTERNAL MEDICINE

## 2024-11-07 PROCEDURE — 1159F MED LIST DOCD IN RCRD: CPT | Performed by: INTERNAL MEDICINE

## 2024-11-07 PROCEDURE — 1036F TOBACCO NON-USER: CPT | Performed by: INTERNAL MEDICINE

## 2024-11-07 PROCEDURE — 83036 HEMOGLOBIN GLYCOSYLATED A1C: CPT | Performed by: INTERNAL MEDICINE

## 2024-11-07 ASSESSMENT — ENCOUNTER SYMPTOMS
NAUSEA: 0
COUGH: 0
VOMITING: 0
SHORTNESS OF BREATH: 0
HEADACHES: 0
PALPITATIONS: 0
FEVER: 0
CHILLS: 0
FATIGUE: 0
DIARRHEA: 0

## 2024-11-07 NOTE — PROGRESS NOTES
Endocrinology: Follow up visit  Subjective   Patient ID: Nata Ridley is a 74 y.o. female who presents for Diabetes (Type 1 ).    PCP: Patt Nugent MD    HPI  Dm1: on tandem+ dexcom 6.     Checks sugars 4x+ a day  Injects insulin via insulin pump  Currently utilizing cgm to check sugars     Since last visit sugars are great on review of tconnect data  78% tir the last 2 months.  Data reviewed and looks good.  Recent labs with pcp were good but due for A1c    Review of Systems   Constitutional:  Negative for chills, fatigue and fever.   Respiratory:  Negative for cough and shortness of breath.    Cardiovascular:  Negative for chest pain and palpitations.   Gastrointestinal:  Negative for diarrhea, nausea and vomiting.   Neurological:  Negative for headaches.       Patient Active Problem List   Diagnosis    Abnormal ECG    Adult hypothyroidism    At high risk for breast cancer    Counseling for travel    Dense breast tissue on mammogram    Depression    Diabetes mellitus type I, controlled (Multi)    Dysuria    GERD without esophagitis    Hemorrhoid    Malaise    Orthostatic lightheadedness    Poorly controlled type 1 diabetes mellitus    Thumb laceration, left, initial encounter    Vitamin D deficiency    Vertigo    Vulvar irritation        Home Meds:  Current Outpatient Medications   Medication Instructions    Admelog U-100 Insulin lispro 100 unit/mL injection Use as directed with insulin pump up to 50 units daily    Autosoft 90 infusion set CHANGE EVERY THREE (3) DAYS    b complex 0.4 mg tablet 1 tablet, Daily    biotin 5 mg tablet 1 tablet, Daily    blood sugar diagnostic (FreeStyle Lite Strips) strip CHECK BLOOD SUGAR 3 TIMES A DAY    blood-glucose sensor (DEXCOM G6 SENSOR MISC) Use as directed changed every 10 days    blood-glucose transmitter (DEXCOM G6 TRANSMITTER MISC) Use one transmitter with /sensors and replace every 3 months    cholecalciferol (Vitamin D-3) 50 MCG (2000 UT) tablet 1 tablet,  "Daily    Dexcom G4 platinum  (Dexcom G6 ) misc To monitor sugars e10.9    FreeStyle Lancets 28 gauge 1 each, As needed    glucosamine stein 2KCl-chondroit 500-400 mg tablet 2 tablets, Daily    insulin degludec (Tresiba FlexTouch U-100) 100 unit/mL (3 mL) injection Inject under the skin. USE AS DIRECTED.    insulin syringe-needle U-100 (BD Insulin Syringe Ultra-Fine) 31G X 5/16\" 0.3 mL syringe 3 times daily    magnesium oxide 500 mg tablet 1 tablet, Daily    pen needle, diabetic (BD Ultra-Fine Yuliet Pen Needle) 32 gauge x 5/32\" needle 4 times daily    t:slim X2 cartridge CHANGE EVERY THREE (3) DAYS        No Known Allergies     Objective   Vitals:    11/07/24 1253   BP: 122/62   Pulse: 69   Resp: 16      Vitals:    11/07/24 1253   Weight: 63.1 kg (139 lb 3.2 oz)      Body mass index is 21.8 kg/m².   Physical Exam  Constitutional:       Appearance: Normal appearance. She is normal weight.   HENT:      Head: Normocephalic and atraumatic.   Neck:      Thyroid: No thyroid mass, thyromegaly or thyroid tenderness.   Cardiovascular:      Rate and Rhythm: Normal rate and regular rhythm.      Heart sounds: No murmur heard.     No gallop.   Pulmonary:      Effort: Pulmonary effort is normal.      Breath sounds: Normal breath sounds.   Abdominal:      Palpations: Abdomen is soft.      Comments: benign   Neurological:      General: No focal deficit present.      Mental Status: She is alert and oriented to person, place, and time.      Deep Tendon Reflexes: Reflexes are normal and symmetric.   Psychiatric:         Behavior: Behavior is cooperative.         Labs:  Lab Results   Component Value Date    HGBA1C 6.6 (A) 07/23/2024    TSH 1.88 04/16/2024      No results found for: \"PR1\", \"THYROIDPAB\", \"TSI\"     Assessment/Plan   Assessment & Plan  Type 1 diabetes mellitus without complication  A1c today  Sugars look great  Orders:    POCT glycosylated hemoglobin (Hb A1C) manually resulted    Adult hypothyroidism    Continue " current t4 dose      Electronically signed by:  Ana Enrique MD 11/07/24 12:54 PM

## 2024-11-09 DIAGNOSIS — E10.9 TYPE 1 DIABETES MELLITUS WITHOUT COMPLICATION: Primary | ICD-10-CM

## 2024-11-09 RX ORDER — INSULIN LISPRO 100 [IU]/ML
INJECTION, SOLUTION INTRAVENOUS; SUBCUTANEOUS
Qty: 50 ML | Refills: 3 | Status: SHIPPED | OUTPATIENT
Start: 2024-11-09

## 2025-02-13 ENCOUNTER — APPOINTMENT (OUTPATIENT)
Dept: ENDOCRINOLOGY | Facility: CLINIC | Age: 75
End: 2025-02-13
Payer: MEDICARE

## 2025-02-13 VITALS
HEART RATE: 76 BPM | DIASTOLIC BLOOD PRESSURE: 70 MMHG | BODY MASS INDEX: 21.66 KG/M2 | HEIGHT: 67 IN | RESPIRATION RATE: 16 BRPM | SYSTOLIC BLOOD PRESSURE: 120 MMHG | WEIGHT: 138 LBS

## 2025-02-13 DIAGNOSIS — E03.9 ADULT HYPOTHYROIDISM: ICD-10-CM

## 2025-02-13 DIAGNOSIS — E10.9 TYPE 1 DIABETES MELLITUS WITHOUT COMPLICATION: Primary | ICD-10-CM

## 2025-02-13 PROCEDURE — 3008F BODY MASS INDEX DOCD: CPT | Performed by: INTERNAL MEDICINE

## 2025-02-13 PROCEDURE — 1036F TOBACCO NON-USER: CPT | Performed by: INTERNAL MEDICINE

## 2025-02-13 PROCEDURE — 99213 OFFICE O/P EST LOW 20 MIN: CPT | Performed by: INTERNAL MEDICINE

## 2025-02-13 PROCEDURE — 3074F SYST BP LT 130 MM HG: CPT | Performed by: INTERNAL MEDICINE

## 2025-02-13 PROCEDURE — 1159F MED LIST DOCD IN RCRD: CPT | Performed by: INTERNAL MEDICINE

## 2025-02-13 PROCEDURE — 1160F RVW MEDS BY RX/DR IN RCRD: CPT | Performed by: INTERNAL MEDICINE

## 2025-02-13 PROCEDURE — 3078F DIAST BP <80 MM HG: CPT | Performed by: INTERNAL MEDICINE

## 2025-02-13 PROCEDURE — G2211 COMPLEX E/M VISIT ADD ON: HCPCS | Performed by: INTERNAL MEDICINE

## 2025-02-13 ASSESSMENT — ENCOUNTER SYMPTOMS
FATIGUE: 0
PALPITATIONS: 0
DIARRHEA: 0
COUGH: 0
CHILLS: 0
HEADACHES: 0
NAUSEA: 0
VOMITING: 0
SHORTNESS OF BREATH: 0
FEVER: 0

## 2025-02-13 NOTE — PROGRESS NOTES
Endocrinology: Follow up visit  Subjective   Patient ID: Nata Ridley is a 74 y.o. female who presents for Diabetes (Type 1 ).    PCP: Patt Nugent MD    HPI  Dm1: on tandem+ dexcom 6.     Since last visit doing great with sugars   79% tir  Only issue is some constipation over the last few months: doing well since started metamucil    Checks sugars 4x+ a day  Injects insulin via insulin pump  Currently utilizing cgm to check sugars     Review of Systems   Constitutional:  Negative for chills, fatigue and fever.   Respiratory:  Negative for cough and shortness of breath.    Cardiovascular:  Negative for chest pain and palpitations.   Gastrointestinal:  Negative for diarrhea, nausea and vomiting.   Neurological:  Negative for headaches.       Patient Active Problem List   Diagnosis    Abnormal ECG    Adult hypothyroidism    At high risk for breast cancer    Counseling for travel    Dense breast tissue on mammogram    Depression    Diabetes mellitus type I, controlled (Multi)    Dysuria    GERD without esophagitis    Hemorrhoid    Malaise    Orthostatic lightheadedness    Poorly controlled type 1 diabetes mellitus    Thumb laceration, left, initial encounter    Vitamin D deficiency    Vertigo    Vulvar irritation        Home Meds:  Current Outpatient Medications   Medication Instructions    Admelog U-100 Insulin lispro 100 unit/mL injection Use as directed with insulin pump up to 50 units daily    Autosoft 90 infusion set CHANGE EVERY THREE (3) DAYS    b complex 0.4 mg tablet 1 tablet, Daily    biotin 5 mg tablet 1 tablet, Daily    blood sugar diagnostic (FreeStyle Lite Strips) strip CHECK BLOOD SUGAR 3 TIMES A DAY    blood-glucose sensor (DEXCOM G6 SENSOR MISC) Use as directed changed every 10 days    blood-glucose transmitter (DEXCOM G6 TRANSMITTER MISC) Use one transmitter with /sensors and replace every 3 months    cholecalciferol (Vitamin D-3) 50 MCG (2000 UT) tablet 1 tablet, Daily    Dexcom G4  "platinum  (Dexcom G6 ) misc To monitor sugars e10.9    FreeStyle Lancets 28 gauge 1 each, As needed    glucosamine stein 2KCl-chondroit 500-400 mg tablet 2 tablets, Daily    insulin degludec (Tresiba FlexTouch U-100) 100 unit/mL (3 mL) injection Inject under the skin. USE AS DIRECTED.    insulin lispro 100 unit/mL injection Take as directed per insulin pump up to 50 units daily    insulin syringe-needle U-100 (BD Insulin Syringe Ultra-Fine) 31G X 5/16\" 0.3 mL syringe 3 times daily    magnesium oxide 500 mg tablet 1 tablet, Daily    pen needle, diabetic (BD Ultra-Fine Yuliet Pen Needle) 32 gauge x 5/32\" needle 4 times daily    t:slim X2 cartridge CHANGE EVERY THREE (3) DAYS        No Known Allergies     Objective   Vitals:    02/13/25 1205   BP: 120/70   Pulse: 76   Resp: 16      Vitals:    02/13/25 1205   Weight: 62.6 kg (138 lb)      Body mass index is 21.61 kg/m².   Physical Exam  Constitutional:       Appearance: Normal appearance. She is normal weight.   HENT:      Head: Normocephalic and atraumatic.   Neck:      Thyroid: No thyroid mass, thyromegaly or thyroid tenderness.   Cardiovascular:      Rate and Rhythm: Normal rate and regular rhythm.      Heart sounds: No murmur heard.     No gallop.   Pulmonary:      Effort: Pulmonary effort is normal.      Breath sounds: Normal breath sounds.   Abdominal:      Palpations: Abdomen is soft.      Comments: benign   Neurological:      General: No focal deficit present.      Mental Status: She is alert and oriented to person, place, and time.      Deep Tendon Reflexes: Reflexes are normal and symmetric.   Psychiatric:         Behavior: Behavior is cooperative.         Labs:  Lab Results   Component Value Date    HGBA1C 6.2 11/07/2024    TSH 1.88 04/16/2024      No results found for: \"PR1\", \"THYROIDPAB\", \"TSI\"     Assessment/Plan   Assessment & Plan  Type 1 diabetes mellitus without complication  Sugars are great  Labs due: include tsh   Follow up in 3 " months  Orders:    Comprehensive Metabolic Panel; Future    Hemoglobin A1C; Future        Electronically signed by:  Ana Enrique MD 02/13/25 12:12 PM          \

## 2025-02-14 LAB
ALBUMIN SERPL-MCNC: 4.3 G/DL (ref 3.6–5.1)
ALP SERPL-CCNC: 85 U/L (ref 37–153)
ALT SERPL-CCNC: 13 U/L (ref 6–29)
ANION GAP SERPL CALCULATED.4IONS-SCNC: 8 MMOL/L (CALC) (ref 7–17)
AST SERPL-CCNC: 15 U/L (ref 10–35)
BILIRUB SERPL-MCNC: 0.6 MG/DL (ref 0.2–1.2)
BUN SERPL-MCNC: 28 MG/DL (ref 7–25)
CALCIUM SERPL-MCNC: 9.4 MG/DL (ref 8.6–10.4)
CHLORIDE SERPL-SCNC: 105 MMOL/L (ref 98–110)
CO2 SERPL-SCNC: 28 MMOL/L (ref 20–32)
CREAT SERPL-MCNC: 0.8 MG/DL (ref 0.6–1)
EGFRCR SERPLBLD CKD-EPI 2021: 78 ML/MIN/1.73M2
EST. AVERAGE GLUCOSE BLD GHB EST-MCNC: 151 MG/DL
EST. AVERAGE GLUCOSE BLD GHB EST-SCNC: 8.4 MMOL/L
GLUCOSE SERPL-MCNC: 94 MG/DL (ref 65–139)
HBA1C MFR BLD: 6.9 % OF TOTAL HGB
POTASSIUM SERPL-SCNC: 4.5 MMOL/L (ref 3.5–5.3)
PROT SERPL-MCNC: 6.6 G/DL (ref 6.1–8.1)
SODIUM SERPL-SCNC: 141 MMOL/L (ref 135–146)
TSH SERPL-ACNC: 1.76 MIU/L (ref 0.4–4.5)

## 2025-03-31 ENCOUNTER — TELEPHONE (OUTPATIENT)
Dept: OBSTETRICS AND GYNECOLOGY | Facility: CLINIC | Age: 75
End: 2025-03-31
Payer: MEDICARE

## 2025-04-01 ENCOUNTER — TELEPHONE (OUTPATIENT)
Facility: CLINIC | Age: 75
End: 2025-04-01
Payer: MEDICARE

## 2025-04-01 DIAGNOSIS — Z91.89 AT HIGH RISK FOR BREAST CANCER: ICD-10-CM

## 2025-04-01 DIAGNOSIS — Z12.31 ENCOUNTER FOR SCREENING MAMMOGRAM FOR MALIGNANT NEOPLASM OF BREAST: Primary | ICD-10-CM

## 2025-04-03 DIAGNOSIS — Z91.89 AT HIGH RISK FOR BREAST CANCER: ICD-10-CM

## 2025-04-04 ENCOUNTER — HOSPITAL ENCOUNTER (OUTPATIENT)
Dept: RADIOLOGY | Facility: CLINIC | Age: 75
Discharge: HOME | End: 2025-04-04
Payer: MEDICARE

## 2025-04-04 VITALS — WEIGHT: 138.01 LBS | HEIGHT: 67 IN | BODY MASS INDEX: 21.66 KG/M2

## 2025-04-04 DIAGNOSIS — Z91.89 AT HIGH RISK FOR BREAST CANCER: ICD-10-CM

## 2025-04-04 DIAGNOSIS — R92.8 OTHER ABNORMAL AND INCONCLUSIVE FINDINGS ON DIAGNOSTIC IMAGING OF BREAST: ICD-10-CM

## 2025-04-04 PROCEDURE — 76982 USE 1ST TARGET LESION: CPT

## 2025-04-04 PROCEDURE — 77065 DX MAMMO INCL CAD UNI: CPT | Mod: RT

## 2025-04-04 PROCEDURE — 76642 ULTRASOUND BREAST LIMITED: CPT | Mod: RT

## 2025-04-09 ENCOUNTER — TELEPHONE (OUTPATIENT)
Dept: OBSTETRICS AND GYNECOLOGY | Facility: CLINIC | Age: 75
End: 2025-04-09
Payer: MEDICARE

## 2025-04-09 NOTE — TELEPHONE ENCOUNTER
Called patient to discuss results  Identified by name and   Informed patient of results  Apologized for lack of communication and entering of order last week as RN was out and appears as though there was some miscommunication between staff  Patient verbalized understanding, all questions/concerns addressed at this time.    PEBBLES Barfield RN    ----- Message from Shawanda Correia sent at 2025  9:19 AM EDT -----  Please notify patient that recent diagnostic breast imaging was normal with no concerning findings. Thank you.

## 2025-04-11 DIAGNOSIS — E10.9 TYPE 1 DIABETES MELLITUS WITHOUT COMPLICATION: Primary | ICD-10-CM

## 2025-04-11 RX ORDER — INSULIN DEGLUDEC 100 U/ML
INJECTION, SOLUTION SUBCUTANEOUS
Qty: 3 ML | Refills: 0 | Status: SHIPPED | OUTPATIENT
Start: 2025-04-11

## 2025-05-08 ENCOUNTER — APPOINTMENT (OUTPATIENT)
Dept: ENDOCRINOLOGY | Facility: CLINIC | Age: 75
End: 2025-05-08
Payer: MEDICARE

## 2025-05-08 VITALS
DIASTOLIC BLOOD PRESSURE: 64 MMHG | BODY MASS INDEX: 21.28 KG/M2 | HEIGHT: 67 IN | SYSTOLIC BLOOD PRESSURE: 112 MMHG | RESPIRATION RATE: 16 BRPM | HEART RATE: 80 BPM | WEIGHT: 135.6 LBS

## 2025-05-08 DIAGNOSIS — E03.9 ADULT HYPOTHYROIDISM: ICD-10-CM

## 2025-05-08 DIAGNOSIS — E10.9 TYPE 1 DIABETES MELLITUS WITHOUT COMPLICATION: Primary | ICD-10-CM

## 2025-05-08 DIAGNOSIS — K64.9 ACUTE HEMORRHOID: ICD-10-CM

## 2025-05-08 LAB — POC HEMOGLOBIN A1C: 6.7 % (ref 4.2–6.5)

## 2025-05-08 PROCEDURE — 1159F MED LIST DOCD IN RCRD: CPT | Performed by: INTERNAL MEDICINE

## 2025-05-08 PROCEDURE — G2211 COMPLEX E/M VISIT ADD ON: HCPCS | Performed by: INTERNAL MEDICINE

## 2025-05-08 PROCEDURE — 3008F BODY MASS INDEX DOCD: CPT | Performed by: INTERNAL MEDICINE

## 2025-05-08 PROCEDURE — 1160F RVW MEDS BY RX/DR IN RCRD: CPT | Performed by: INTERNAL MEDICINE

## 2025-05-08 PROCEDURE — 3078F DIAST BP <80 MM HG: CPT | Performed by: INTERNAL MEDICINE

## 2025-05-08 PROCEDURE — 99214 OFFICE O/P EST MOD 30 MIN: CPT | Performed by: INTERNAL MEDICINE

## 2025-05-08 PROCEDURE — 83036 HEMOGLOBIN GLYCOSYLATED A1C: CPT | Performed by: INTERNAL MEDICINE

## 2025-05-08 PROCEDURE — 1036F TOBACCO NON-USER: CPT | Performed by: INTERNAL MEDICINE

## 2025-05-08 PROCEDURE — 3044F HG A1C LEVEL LT 7.0%: CPT | Performed by: INTERNAL MEDICINE

## 2025-05-08 PROCEDURE — 3074F SYST BP LT 130 MM HG: CPT | Performed by: INTERNAL MEDICINE

## 2025-05-08 RX ORDER — HYDROCORTISONE 25 MG/G
CREAM TOPICAL 2 TIMES DAILY
Qty: 30 G | Refills: 0 | Status: SHIPPED | OUTPATIENT
Start: 2025-05-08

## 2025-05-08 ASSESSMENT — ENCOUNTER SYMPTOMS
NAUSEA: 0
HEADACHES: 0
PALPITATIONS: 0
CHILLS: 0
FATIGUE: 0
COUGH: 0
DIARRHEA: 0
SHORTNESS OF BREATH: 0
FEVER: 0
VOMITING: 0

## 2025-05-08 NOTE — PROGRESS NOTES
Endocrinology: Follow up visit  Subjective   Patient ID: Nata Ridley is a 74 y.o. female who presents for Diabetes (Type 1 ).    PCP: Patt Nugent MD    HPI  Dm1: on tandem+ dexcom 6.     Since last visit doing great with sugars   75% tir  Only issue is concerned running too low overnight at times.  When up going to the bathroom notes it is low    Checks sugars 4x+ a day  Injects insulin via insulin pump  Currently utilizing cgm to check sugars  Review of Systems   Constitutional:  Negative for chills, fatigue and fever.   Respiratory:  Negative for cough and shortness of breath.    Cardiovascular:  Negative for chest pain and palpitations.   Gastrointestinal:  Negative for diarrhea, nausea and vomiting.   Neurological:  Negative for headaches.       Problem List[1]     Home Meds:  Current Outpatient Medications   Medication Instructions    Admelog U-100 Insulin lispro 100 unit/mL injection Use as directed with insulin pump up to 50 units daily    Autosoft 90 infusion set CHANGE EVERY THREE (3) DAYS    b complex 0.4 mg tablet 1 tablet, Daily    biotin 5 mg tablet 1 tablet, Daily    blood sugar diagnostic (FreeStyle Lite Strips) strip CHECK BLOOD SUGAR 3 TIMES A DAY    blood-glucose sensor (DEXCOM G6 SENSOR MISC) Use as directed changed every 10 days    blood-glucose transmitter (DEXCOM G6 TRANSMITTER MISC) Use one transmitter with /sensors and replace every 3 months    cholecalciferol (Vitamin D-3) 50 MCG (2000 UT) tablet 1 tablet, Daily    Dexcom G4 platinum  (Dexcom G6 ) misc To monitor sugars e10.9    FreeStyle Lancets 28 gauge 1 each, As needed    glucosamine stein 2KCl-chondroit 500-400 mg tablet 2 tablets, Daily    hydrocortisone (Proctozone-HC) 2.5 % rectal cream rectal, 2 times daily    insulin degludec (Tresiba FlexTouch U-100) 100 unit/mL (3 mL) pen USE AS DIRECTED for pump failure up to 20 units daily    insulin lispro 100 unit/mL injection Take as directed per insulin pump up  "to 50 units daily    insulin syringe-needle U-100 (BD Insulin Syringe Ultra-Fine) 31G X 5/16\" 0.3 mL syringe 3 times daily    magnesium oxide 500 mg tablet 1 tablet, Daily    pen needle, diabetic (BD Ultra-Fine Yuliet Pen Needle) 32 gauge x 5/32\" needle 4 times daily    t:slim X2 cartridge CHANGE EVERY THREE (3) DAYS        RX Allergies[2]     Objective   Vitals:    05/08/25 1235   BP: 112/64   Pulse: 80   Resp: 16      Vitals:    05/08/25 1235   Weight: 61.5 kg (135 lb 9.6 oz)      Body mass index is 21.23 kg/m².   Physical Exam  Constitutional:       Appearance: Normal appearance. She is normal weight.   HENT:      Head: Normocephalic and atraumatic.   Neck:      Thyroid: No thyroid mass, thyromegaly or thyroid tenderness.   Cardiovascular:      Rate and Rhythm: Normal rate and regular rhythm.      Heart sounds: No murmur heard.     No gallop.   Pulmonary:      Effort: Pulmonary effort is normal.      Breath sounds: Normal breath sounds.   Abdominal:      Palpations: Abdomen is soft.      Comments: benign   Neurological:      General: No focal deficit present.      Mental Status: She is alert and oriented to person, place, and time.      Deep Tendon Reflexes: Reflexes are normal and symmetric.   Psychiatric:         Behavior: Behavior is cooperative.         Labs:  Lab Results   Component Value Date    HGBA1C 6.9 (H) 02/13/2025    TSH 1.76 02/13/2025      No results found for: \"PR1\", \"THYROIDPAB\", \"TSI\"     Assessment/Plan   Assessment & Plan  Type 1 diabetes mellitus without complication  A1c today  Adjusted overnight rates today, she is using sleep mode    Orders:    POCT glycosylated hemoglobin (Hb A1C) manually resulted    Adult hypothyroidism    Recheck last time normal  Acute hemorrhoid  Asking for renewal of cream for hemorrhoids.  Uses it sparingly  Orders:    hydrocortisone (Proctozone-HC) 2.5 % rectal cream; Insert into the rectum 2 times a day.        Electronically signed by:  Ana Enrique MD 05/08/25 " 1:08 PM                   [1]   Patient Active Problem List  Diagnosis    Abnormal ECG    Adult hypothyroidism    At high risk for breast cancer    Counseling for travel    Dense breast tissue on mammogram    Depression    Diabetes mellitus type I, controlled (Multi)    Dysuria    GERD without esophagitis    Hemorrhoid    Malaise    Orthostatic lightheadedness    Poorly controlled type 1 diabetes mellitus    Thumb laceration, left, initial encounter    Vitamin D deficiency    Vertigo    Vulvar irritation   [2] No Known Allergies

## 2025-05-09 DIAGNOSIS — E10.9 TYPE 1 DIABETES MELLITUS WITHOUT COMPLICATION: ICD-10-CM

## 2025-05-10 RX ORDER — INSULIN PUMP CARTRIDGE
CARTRIDGE (EA) SUBCUTANEOUS
Qty: 30 EACH | Refills: 2 | Status: SHIPPED | OUTPATIENT
Start: 2025-05-10

## 2025-05-10 RX ORDER — INFUSION SET FOR INSULIN PUMP
INFUSION SETS-PARAPHERNALIA MISCELLANEOUS
Qty: 30 EACH | Refills: 2 | Status: SHIPPED | OUTPATIENT
Start: 2025-05-10

## 2025-08-05 ENCOUNTER — TELEPHONE (OUTPATIENT)
Facility: CLINIC | Age: 75
End: 2025-08-05
Payer: MEDICARE

## 2025-08-07 ENCOUNTER — APPOINTMENT (OUTPATIENT)
Dept: ENDOCRINOLOGY | Facility: CLINIC | Age: 75
End: 2025-08-07
Payer: MEDICARE

## 2025-08-07 VITALS
HEIGHT: 67 IN | DIASTOLIC BLOOD PRESSURE: 64 MMHG | WEIGHT: 131 LBS | RESPIRATION RATE: 16 BRPM | BODY MASS INDEX: 20.56 KG/M2 | HEART RATE: 53 BPM | SYSTOLIC BLOOD PRESSURE: 120 MMHG

## 2025-08-07 DIAGNOSIS — E10.9 TYPE 1 DIABETES MELLITUS WITHOUT COMPLICATION: Primary | ICD-10-CM

## 2025-08-07 DIAGNOSIS — E03.9 ADULT HYPOTHYROIDISM: ICD-10-CM

## 2025-08-07 PROCEDURE — 3044F HG A1C LEVEL LT 7.0%: CPT | Performed by: INTERNAL MEDICINE

## 2025-08-07 PROCEDURE — 1160F RVW MEDS BY RX/DR IN RCRD: CPT | Performed by: INTERNAL MEDICINE

## 2025-08-07 PROCEDURE — 1126F AMNT PAIN NOTED NONE PRSNT: CPT | Performed by: INTERNAL MEDICINE

## 2025-08-07 PROCEDURE — G2211 COMPLEX E/M VISIT ADD ON: HCPCS | Performed by: INTERNAL MEDICINE

## 2025-08-07 PROCEDURE — 3078F DIAST BP <80 MM HG: CPT | Performed by: INTERNAL MEDICINE

## 2025-08-07 PROCEDURE — 1159F MED LIST DOCD IN RCRD: CPT | Performed by: INTERNAL MEDICINE

## 2025-08-07 PROCEDURE — 1036F TOBACCO NON-USER: CPT | Performed by: INTERNAL MEDICINE

## 2025-08-07 PROCEDURE — 99214 OFFICE O/P EST MOD 30 MIN: CPT | Performed by: INTERNAL MEDICINE

## 2025-08-07 PROCEDURE — 3074F SYST BP LT 130 MM HG: CPT | Performed by: INTERNAL MEDICINE

## 2025-08-07 PROCEDURE — 3008F BODY MASS INDEX DOCD: CPT | Performed by: INTERNAL MEDICINE

## 2025-08-07 RX ORDER — LANCETS 28 GAUGE
EACH MISCELLANEOUS
Qty: 300 EACH | Refills: 3 | Status: SHIPPED | OUTPATIENT
Start: 2025-08-07

## 2025-08-07 RX ORDER — BLOOD-GLUCOSE METER
KIT MISCELLANEOUS
Qty: 300 STRIP | Refills: 3 | Status: SHIPPED | OUTPATIENT
Start: 2025-08-07

## 2025-08-07 ASSESSMENT — ENCOUNTER SYMPTOMS
VOMITING: 0
COUGH: 0
HEADACHES: 0
DEPRESSION: 0
FEVER: 0
OCCASIONAL FEELINGS OF UNSTEADINESS: 0
DIARRHEA: 0
NAUSEA: 0
LOSS OF SENSATION IN FEET: 0
SHORTNESS OF BREATH: 0
PALPITATIONS: 0
FATIGUE: 0
CHILLS: 0

## 2025-08-07 ASSESSMENT — PATIENT HEALTH QUESTIONNAIRE - PHQ9
1. LITTLE INTEREST OR PLEASURE IN DOING THINGS: NOT AT ALL
SUM OF ALL RESPONSES TO PHQ9 QUESTIONS 1 AND 2: 0
2. FEELING DOWN, DEPRESSED OR HOPELESS: NOT AT ALL

## 2025-08-07 ASSESSMENT — PAIN SCALES - GENERAL: PAINLEVEL_OUTOF10: 0-NO PAIN

## 2025-08-07 NOTE — PROGRESS NOTES
Endocrinology: Follow up visit  Subjective   Patient ID: Nata Ridley is a 75 y.o. female who presents for Diabetes.    PCP: Patt Nugent MD    HPI  Dm1: on tandem+ dexcom 6.    Issues with erratic sugars this summer with heat exposure.    Have adjusted several settings and doing a bit better  Tir excellent at 78% 0.4% lows  Feeling fine     Checks sugars 4x+ a day  Injects insulin via insulin pump  Currently utilizing cgm to check sugars  Review of Systems   Constitutional:  Negative for chills, fatigue and fever.   Respiratory:  Negative for cough and shortness of breath.    Cardiovascular:  Negative for chest pain and palpitations.   Gastrointestinal:  Negative for diarrhea, nausea and vomiting.   Neurological:  Negative for headaches.       Problem List[1]     Home Meds:  Current Outpatient Medications   Medication Instructions    Admelog U-100 Insulin lispro 100 unit/mL injection Use as directed with insulin pump up to 50 units daily    Autosoft 90 infusion set CHANGE EVERY THREE (3) DAYS    b complex 0.4 mg tablet 1 tablet, Daily    biotin 5 mg tablet 1 tablet, Daily    blood sugar diagnostic (FreeStyle Lite Strips) strip CHECK BLOOD SUGAR 3 TIMES A DAY    blood-glucose sensor (DEXCOM G6 SENSOR MISC) Use as directed changed every 10 days    blood-glucose transmitter (DEXCOM G6 TRANSMITTER MISC) Use one transmitter with /sensors and replace every 3 months    cholecalciferol (Vitamin D-3) 50 MCG (2000 UT) tablet 1 tablet, Daily    Dexcom G4 platinum  (Dexcom G6 ) misc To monitor sugars e10.9    FreeStyle Lancets 28 gauge 1 each, As needed    glucosamine stein 2KCl-chondroit 500-400 mg tablet 2 tablets, Daily    hydrocortisone (Proctozone-HC) 2.5 % rectal cream rectal, 2 times daily    insulin degludec (Tresiba FlexTouch U-100) 100 unit/mL (3 mL) pen USE AS DIRECTED for pump failure up to 20 units daily    insulin lispro 100 unit/mL injection Take as directed per insulin pump up to 50  "units daily    insulin syringe-needle U-100 (BD Insulin Syringe Ultra-Fine) 31G X 5/16\" 0.3 mL syringe 3 times daily    magnesium oxide 500 mg tablet 1 tablet, Daily    pen needle, diabetic (BD Ultra-Fine Yuliet Pen Needle) 32 gauge x 5/32\" needle 4 times daily    t:slim X2 cartridge CHANGE EVERY THREE (3) DAYS        RX Allergies[2]     Objective   Vitals:    08/07/25 1248   BP: 120/64   Pulse: 53   Resp: 16      Vitals:    08/07/25 1248   Weight: 59.4 kg (131 lb)      Body mass index is 20.52 kg/m².   Physical Exam  Constitutional:       Appearance: Normal appearance. She is normal weight.   HENT:      Head: Normocephalic and atraumatic.   Neck:      Thyroid: No thyroid mass, thyromegaly or thyroid tenderness.     Cardiovascular:      Rate and Rhythm: Normal rate and regular rhythm.      Heart sounds: No murmur heard.     No gallop.   Pulmonary:      Effort: Pulmonary effort is normal.      Breath sounds: Normal breath sounds.   Abdominal:      Palpations: Abdomen is soft.      Comments: benign     Neurological:      General: No focal deficit present.      Mental Status: She is alert and oriented to person, place, and time.      Deep Tendon Reflexes: Reflexes are normal and symmetric.     Psychiatric:         Behavior: Behavior is cooperative.         Labs:  Lab Results   Component Value Date    HGBA1C 6.7 (A) 05/08/2025    TSH 1.76 02/13/2025      No results found for: \"PR1\", \"THYROIDPAB\", \"TSI\"     Assessment/Plan   Assessment & Plan  Type 1 diabetes mellitus without complication  Due for fasting labs  Sugars look great  Advised not covering small afternoon snack she has when concerned about lows    Orders:    CBC; Future    Comprehensive Metabolic Panel; Future    Lipid Panel; Future    Hemoglobin A1C; Future    Albumin-Creatinine Ratio, Urine Random; Future    Adult hypothyroidism    Tsh February normal at 1.76      Electronically signed by:  Ana Enrique MD 08/07/25 12:51 PM                   [1]   Patient " Active Problem List  Diagnosis    Abnormal ECG    Adult hypothyroidism    At high risk for breast cancer    Counseling for travel    Dense breast tissue on mammogram    Depression    Diabetes mellitus type I, controlled (Multi)    Dysuria    GERD without esophagitis    Hemorrhoid    Malaise    Orthostatic lightheadedness    Poorly controlled type 1 diabetes mellitus    Thumb laceration, left, initial encounter    Vitamin D deficiency    Vertigo    Vulvar irritation   [2] No Known Allergies

## 2025-08-07 NOTE — PATIENT INSTRUCTIONS
No adjusts today  Work on pm snack and not covering when eating to prevent low  Follow up in 3 months  Fasting labs when able

## 2025-08-12 ENCOUNTER — TELEPHONE (OUTPATIENT)
Facility: CLINIC | Age: 75
End: 2025-08-12
Payer: MEDICARE

## 2025-08-30 LAB
ALBUMIN SERPL-MCNC: 4.1 G/DL (ref 3.6–5.1)
ALBUMIN/CREAT UR: NORMAL
ALP SERPL-CCNC: 94 U/L (ref 37–153)
ALT SERPL-CCNC: 20 U/L (ref 6–29)
ANION GAP SERPL CALCULATED.4IONS-SCNC: 8 MMOL/L (CALC) (ref 7–17)
AST SERPL-CCNC: 23 U/L (ref 10–35)
BILIRUB SERPL-MCNC: 0.4 MG/DL (ref 0.2–1.2)
BUN SERPL-MCNC: 27 MG/DL (ref 7–25)
CALCIUM SERPL-MCNC: 9.2 MG/DL (ref 8.6–10.4)
CHLORIDE SERPL-SCNC: 106 MMOL/L (ref 98–110)
CHOLEST SERPL-MCNC: 198 MG/DL
CHOLEST/HDLC SERPL: 2.6 (CALC)
CO2 SERPL-SCNC: 28 MMOL/L (ref 20–32)
CREAT SERPL-MCNC: 0.91 MG/DL (ref 0.6–1)
CREAT UR-MCNC: NORMAL MG/DL
EGFRCR SERPLBLD CKD-EPI 2021: 67 ML/MIN/1.73M2
ERYTHROCYTE [DISTWIDTH] IN BLOOD BY AUTOMATED COUNT: 12.7 % (ref 11–15)
EST. AVERAGE GLUCOSE BLD GHB EST-MCNC: 154 MG/DL
EST. AVERAGE GLUCOSE BLD GHB EST-SCNC: 8.5 MMOL/L
GLUCOSE SERPL-MCNC: 98 MG/DL (ref 65–99)
HBA1C MFR BLD: 7 %
HCT VFR BLD AUTO: 41.8 % (ref 35–45)
HDLC SERPL-MCNC: 75 MG/DL
HGB BLD-MCNC: 13.6 G/DL (ref 11.7–15.5)
LDLC SERPL CALC-MCNC: 106 MG/DL (CALC)
MCH RBC QN AUTO: 31 PG (ref 27–33)
MCHC RBC AUTO-ENTMCNC: 32.5 G/DL (ref 32–36)
MCV RBC AUTO: 95.2 FL (ref 80–100)
MICROALBUMIN UR-MCNC: NORMAL
NONHDLC SERPL-MCNC: 123 MG/DL (CALC)
PLATELET # BLD AUTO: 258 THOUSAND/UL (ref 140–400)
PMV BLD REES-ECKER: 10 FL (ref 7.5–12.5)
POTASSIUM SERPL-SCNC: 4.6 MMOL/L (ref 3.5–5.3)
PROT SERPL-MCNC: 6.2 G/DL (ref 6.1–8.1)
RBC # BLD AUTO: 4.39 MILLION/UL (ref 3.8–5.1)
SODIUM SERPL-SCNC: 142 MMOL/L (ref 135–146)
TRIGL SERPL-MCNC: 82 MG/DL
WBC # BLD AUTO: 5.1 THOUSAND/UL (ref 3.8–10.8)

## 2025-09-02 LAB
ALBUMIN SERPL-MCNC: 4.1 G/DL (ref 3.6–5.1)
ALBUMIN/CREAT UR: NORMAL MG/G CREAT
ALP SERPL-CCNC: 94 U/L (ref 37–153)
ALT SERPL-CCNC: 20 U/L (ref 6–29)
ANION GAP SERPL CALCULATED.4IONS-SCNC: 8 MMOL/L (CALC) (ref 7–17)
AST SERPL-CCNC: 23 U/L (ref 10–35)
BILIRUB SERPL-MCNC: 0.4 MG/DL (ref 0.2–1.2)
BUN SERPL-MCNC: 27 MG/DL (ref 7–25)
CALCIUM SERPL-MCNC: 9.2 MG/DL (ref 8.6–10.4)
CHLORIDE SERPL-SCNC: 106 MMOL/L (ref 98–110)
CHOLEST SERPL-MCNC: 198 MG/DL
CHOLEST/HDLC SERPL: 2.6 (CALC)
CO2 SERPL-SCNC: 28 MMOL/L (ref 20–32)
CREAT SERPL-MCNC: 0.91 MG/DL (ref 0.6–1)
CREAT UR-MCNC: 95 MG/DL (ref 20–275)
EGFRCR SERPLBLD CKD-EPI 2021: 67 ML/MIN/1.73M2
ERYTHROCYTE [DISTWIDTH] IN BLOOD BY AUTOMATED COUNT: 12.7 % (ref 11–15)
EST. AVERAGE GLUCOSE BLD GHB EST-MCNC: 154 MG/DL
EST. AVERAGE GLUCOSE BLD GHB EST-SCNC: 8.5 MMOL/L
GLUCOSE SERPL-MCNC: 98 MG/DL (ref 65–99)
HBA1C MFR BLD: 7 %
HCT VFR BLD AUTO: 41.8 % (ref 35–45)
HDLC SERPL-MCNC: 75 MG/DL
HGB BLD-MCNC: 13.6 G/DL (ref 11.7–15.5)
LDLC SERPL CALC-MCNC: 106 MG/DL (CALC)
MCH RBC QN AUTO: 31 PG (ref 27–33)
MCHC RBC AUTO-ENTMCNC: 32.5 G/DL (ref 32–36)
MCV RBC AUTO: 95.2 FL (ref 80–100)
MICROALBUMIN UR-MCNC: <0.2 MG/DL
NONHDLC SERPL-MCNC: 123 MG/DL (CALC)
PLATELET # BLD AUTO: 258 THOUSAND/UL (ref 140–400)
PMV BLD REES-ECKER: 10 FL (ref 7.5–12.5)
POTASSIUM SERPL-SCNC: 4.6 MMOL/L (ref 3.5–5.3)
PROT SERPL-MCNC: 6.2 G/DL (ref 6.1–8.1)
RBC # BLD AUTO: 4.39 MILLION/UL (ref 3.8–5.1)
SODIUM SERPL-SCNC: 142 MMOL/L (ref 135–146)
TRIGL SERPL-MCNC: 82 MG/DL
WBC # BLD AUTO: 5.1 THOUSAND/UL (ref 3.8–10.8)

## 2025-11-13 ENCOUNTER — APPOINTMENT (OUTPATIENT)
Dept: ENDOCRINOLOGY | Facility: CLINIC | Age: 75
End: 2025-11-13
Payer: MEDICARE

## 2026-02-12 ENCOUNTER — APPOINTMENT (OUTPATIENT)
Dept: ENDOCRINOLOGY | Facility: CLINIC | Age: 76
End: 2026-02-12
Payer: MEDICARE